# Patient Record
Sex: MALE | Race: WHITE | URBAN - METROPOLITAN AREA
[De-identification: names, ages, dates, MRNs, and addresses within clinical notes are randomized per-mention and may not be internally consistent; named-entity substitution may affect disease eponyms.]

---

## 2017-12-07 ENCOUNTER — TELEPHONE (OUTPATIENT)
Dept: SURGERY | Facility: CLINIC | Age: 62
End: 2017-12-07

## 2017-12-07 NOTE — TELEPHONE ENCOUNTER
Pre Visit Call and Assessment    Date of call:  12/07/2017    Phone numbers:  Home number on file [phone] (home)    Reached patient/confirmed appointment:  No - left message:   on voicemail  Patient care team/Primary provider:  Tae Knott  Referred to:  Dr. Yoan Olmstead    Reason for visit:  New Hernia consult

## 2017-12-08 ENCOUNTER — OFFICE VISIT (OUTPATIENT)
Dept: SURGERY | Facility: CLINIC | Age: 62
End: 2017-12-08

## 2017-12-08 ENCOUNTER — CARE COORDINATION (OUTPATIENT)
Dept: SURGERY | Facility: CLINIC | Age: 62
End: 2017-12-08

## 2017-12-08 VITALS
WEIGHT: 145.7 LBS | DIASTOLIC BLOOD PRESSURE: 49 MMHG | SYSTOLIC BLOOD PRESSURE: 96 MMHG | OXYGEN SATURATION: 97 % | BODY MASS INDEX: 20.86 KG/M2 | HEIGHT: 70 IN | HEART RATE: 102 BPM

## 2017-12-08 DIAGNOSIS — K42.9 UMBILICAL HERNIA WITHOUT OBSTRUCTION AND WITHOUT GANGRENE: Primary | ICD-10-CM

## 2017-12-08 RX ORDER — SPIRONOLACTONE 25 MG/1
50 TABLET ORAL 2 TIMES DAILY
COMMUNITY
Start: 2017-07-28

## 2017-12-08 RX ORDER — POTASSIUM CHLORIDE 1500 MG/1
20 TABLET, EXTENDED RELEASE ORAL EVERY MORNING
COMMUNITY

## 2017-12-08 RX ORDER — METOPROLOL SUCCINATE 25 MG/1
25 TABLET, EXTENDED RELEASE ORAL
COMMUNITY
Start: 2017-07-28 | End: 2018-01-16

## 2017-12-08 RX ORDER — DOXAZOSIN 1 MG/1
TABLET ORAL
COMMUNITY
Start: 2017-06-26 | End: 2018-01-16

## 2017-12-08 RX ORDER — PENTOXIFYLLINE 400 MG/1
400 TABLET, EXTENDED RELEASE ORAL
COMMUNITY
Start: 2017-07-28 | End: 2018-01-16

## 2017-12-08 RX ORDER — FOLIC ACID 1 MG/1
1 TABLET ORAL EVERY MORNING
COMMUNITY

## 2017-12-08 RX ORDER — DIPHENHYDRAMINE HCL 25 MG
50 CAPSULE ORAL
COMMUNITY

## 2017-12-08 RX ORDER — LANOLIN ALCOHOL/MO/W.PET/CERES
100 CREAM (GRAM) TOPICAL EVERY MORNING
COMMUNITY
Start: 2016-12-28

## 2017-12-08 RX ORDER — FUROSEMIDE 40 MG
60 TABLET ORAL EVERY MORNING
COMMUNITY
Start: 2017-07-28

## 2017-12-08 RX ORDER — LACTULOSE 10 G/15ML
22.5 SOLUTION ORAL EVERY MORNING
COMMUNITY

## 2017-12-08 RX ORDER — OMEPRAZOLE 40 MG/1
CAPSULE, DELAYED RELEASE ORAL
COMMUNITY
Start: 2017-03-17

## 2017-12-08 RX ORDER — LANOLIN ALCOHOL/MO/W.PET/CERES
3 CREAM (GRAM) TOPICAL AT BEDTIME
COMMUNITY
Start: 2017-07-28

## 2017-12-08 ASSESSMENT — PAIN SCALES - GENERAL: PAINLEVEL: NO PAIN (0)

## 2017-12-08 NOTE — PATIENT INSTRUCTIONS
Please wear the abdominal binder during the day when you are active.    Dr. Olmstead would like you to schedule a paracentesis the week prior to surgery. Please call the office that has ordered this for you in the past.         After Your Open Umbilical Hernia Repair          Incision care     You may take a shower the day after surgery. Carefully wash your incision with soap and water. Do not submerge yourself in water (bath, whirlpool, hot tub, pool, lake) for 14 days after surgery.     Remove the bandage the day after surgery, but leave the medical tape (Steri-Strips) or glue in place. These will loosen and fall off on their own 5 to 7 days after surgery.       Always wash your hands before touching your incisions or removing bandages.      Other medicines     Wait to start aspirin or blood thinners until the day after surgery. You can continue your regular medicines at your normal time the day after surgery.     Your pain medicine may cause constipation (hard, dry stools). To help with this, take the stool softener your doctor gave you or an over-the-counter stool softener or laxative. You can stop taking this when you are no longer taking pain medicine and your bowel movements are back to normal.      For pain or discomfort     Take the narcotic pain medicine your doctor gave you as needed and as instructed on the bottle. If you prefer to use over-the-counter medication, use acetaminophen (Tylenol) or ibuprofen (Advil, Motrin) as instructed on the box. Do not take Tylenol if it is in your narcotic pain medication.     Use an ice pack on your incision (surgical cut) for 20 minutes at a time as needed for the first 24 hours. Be sure to protect your skin by putting a cloth between the ice pack and your skin.     After 24 hours you can switch to heat for 20 minutes as needed. Be sure to protect your skin by putting a cloth between the heat pack and your skin.         Activities     No driving until you feel it s safe  to do so. Don t drive while taking narcotic pain medicine.     Don t lift anything heavier than 20 pounds for 3 to 4 weeks after surgery.      Special equipment     If you left the hospital in SARAH socks (compression stockings), you may take them off the day after surgery.     If we gave you an abdominal binder, wear it for the first 30 days after surgery. You don t have to wear it when you re sleeping. You can wear it longer than 30 days if you wish.      Diet     You can eat your regular meals after surgery.      When to call the doctor   Call your doctor if you have:     A fever above 101 F (38.3 C) (taken under the tongue), or a fever or chills lasting more than a day.     Redness at the incision site.     Any fluid or blood draining from the incision, especially if it smells bad.      Severe pain that doesn t improve with pain medicine.      We will call you 2 to 4 days after surgery to review this handout, answer questions and help arrange after-surgery care. If you have questions or concerns, please call [phone] during regular office hours. If you need to call after business hours, call [phone] and ask to page the surgeon on-call.             Transversus Abdominis Plane (TAP) Pain Block      What is a TAP block?   A TAP block can help you manage your pain after surgery. TAP stands for transversus abdominis plane, which is a muscle layer in your abdomen (belly). The TAP block uses numbing medicine similar to Novocaine to block pain near the site of your surgery.       Why get a TAP block?     To better manage your pain after surgery. A tap block will help keep the pain from getting severe and out of control.     To block pain signals from the nerve, which helps decrease pain after surgery.     To help you sleep, easily breathe deeply, walk and visit with others.      How is it done?   You will lie still on a table. We will use an ultrasound machine to help us see the correct muscle layer of your  abdomen. Then, we ll use a needle to inject the medicine. We may also give you some sleep medicine to lessen the pain of the injection.       The procedure takes between 5 and 15 minutes. It is usually done right before surgery, but will sometimes be after. It depends on your surgery and care needs.      What can I expect?     You may feel numbness, tingling or a heaviness in your abdomen.      You may have pain control up to 72 hours after surgery.     The TAP block may not lessen all of your surgery pain. But most patients feel 50 to 75 percent less pain than without the block.       Tell your nurse if you have:     Numbness or tingling in areas other than where the injection was     Blurry vision     Ringing in your ears     A metallic taste in your mouth

## 2017-12-08 NOTE — PROGRESS NOTES
Dell Matos is a 62 year old male with a 1 month history of an umbilical hernia with the following symptoms of lump.    Finding was not work related.  Onset did not occur with lifting.  Obstructive symptoms:  no  Urinary difficulties:  no  Chronic cough: no  Constipation:  no  Current level of activity:  Low, uses cane. At home with daughter.    Past medical history, medications, allergies, family history, and social history were reviewed with the patient. Liver failure. Has had inguinal hernias repaired in past. Ascites drained intermittently.    ROS: 10 point review of systems negative except noted in HPI  PHYSICAL EXAM  General appearance-  alert, and in no distress.  Skin- Skin color and turgor decreasedl.  Multiple ecchymotic lesions.  Neck- Neck is supple with no obvious adenopathy.  Lungs- Respiratory effort unlabored.  Gait- using cane.  Abdomen - soft mildly distended, non tender with wide based umbilical hernia with some skin discoloration. No infection.  Impression:  Hernia, umbilical in 62 year old patient with liver failure  Recommendation:  open surgery mesh repair after full review by medical and anesthesia team. Will need peritoneal tap week of surgery.    A full discussion regarding the alternatives, risks, goals, and potential complications for this surgery was completed today.  The patient understood that the potential problems included but are not limited to:  Infection, bleeding, hematoma, seroma, and recurrence. Knows he is at increased risk given medical condition.    The patient verbally expressed understanding, was given the opportunity for questions, and gives full informed consent for the procedure.      Today the patient was instructed on the need for a preoperative H&P, NPO status prior to surgery, and the need to stop anticoagulants prior to surgery.  Additional educational material, soap, and instructions will be mailed out to the patients home.    PAC request made. Arrangements to  be coordinated for ascites tap.    Also sent home with binder to wear at all times.    The total time spent with this patient was 45 minutes.  Of this time, greater than 50% was spent counseling and coordinating care.

## 2017-12-08 NOTE — PROGRESS NOTES
Pre and Post op Patient Education/Teaching Flowsheet  Relevant Diagnosis: hernia  Teaching Topic:  Pre and post op teaching  Person(s) Involved in teaching:  Patient and Daughter     Motivation Level:  Asks Questions:  Yes  Eager to Learn:  Yes  Cooperative:  Yes  Receptive (willing/able to accept information):  Yes  Any cultural factors/[employer] beliefs that may influence understanding or compliance?  No    Patient/caregiver/family demonstrates understanding of the following:  Reason for the appointment, diagnosis, and treatment plan:  Yes  Patient demonstrates understanding of the following:  Pre-op bowel prep:  No  Post-op pain management recommendations (medications, ice compress, binder/athletic supporter (if applicable), etc.:  Yes  Inguinal hernia patients:  Post-op urinary retention- discussed signs/symptoms and visit to ER for Kerns catheter placement and to stay in place for at least 48 hours:  NA  Restrictions:  Yes  Medications to take the day of surgery:  Per PCP  Blood thinner medications discussed and when to stop (if applicable):  Yes  Wound care:  Yes  Diabetes medication management (if applicable):  Per PCP  Which situations necessitate calling provider and whom to contact:  Discussed how to contact the hospital, nurse, and clinic scheduling staff if necessary      Date and time of surgery:  Yes  Location of surgery:  [facility] [place]- 5th [facility] or [place]?  History and Physical and any other testing necessary prior to surgery:  Yes  Required time line for completion of History and Physical and any pre-op testing:  Yes  Discuss need for someone to drive patient home and stay with them for 24 hours:  Yes  Pre-op showering/scrub information with Surgical Scrub:  Yes  NPO Guidelines:  NPO per Anesthesia Guidelines    Infection Prevention: Patient demonstrates understanding of the following:  Patient instructed on hand hygiene:  Yes  Surgical procedure site care will be  taught and will be reviewed at the time of discharge  Signs and symptoms of infection taught:  Yes  Wound care reviewed and will be taught at the time of discharge  Central venous catheter care will be taught at the time of discharge (if applicable)    Post-op follow-up:  Instructional materials used/given/mailed:  [place] Surgery Booklet, post op teaching sheet, Map, Soap, and arrival/location information    Surgical instructions mailed to patient

## 2017-12-08 NOTE — NURSING NOTE
"Chief Complaint   Patient presents with     Clinic Care Coordination - Initial     consult umbillical hernia       Vitals:    12/08/17 1132   BP: 96/49   Pulse: 102   SpO2: 97%   Weight: 66.1 kg (145 lb 11.2 oz)   Height: 1.778 m (5' 10\")       Body mass index is 20.91 kg/(m^2).    Dilma Nam, MELISA                          "

## 2017-12-08 NOTE — LETTER
12/8/2017       RE: Dell Matos  [address] [place] [id number]     Dear Colleague,    Thank you for referring your patient, Dell Matos, to the [facility] GENERAL SURGERY at [facility]. Please see a copy of my visit note below.    Dell Matos is a 62 year old male with a 1 month history of an umbilical hernia with the following symptoms of lump.    Finding was not work related.  Onset did not occur with lifting.  Obstructive symptoms:  no  Urinary difficulties:  no  Chronic cough: no  Constipation:  no  Current level of activity:  Low, uses cane. At home with daughter.    Past medical history, medications, allergies, family history, and social history were reviewed with the patient. Liver failure. Has had inguinal hernias repaired in past. Ascites drained intermittently.    ROS: 10 point review of systems negative except noted in HPI  PHYSICAL EXAM  General appearance-  alert, and in no distress.  Skin- Skin color and turgor decreasedl.  Multiple ecchymotic lesions.  Neck- Neck is supple with no obvious adenopathy.  Lungs- Respiratory effort unlabored.  Gait- using cane.  Abdomen - soft mildly distended, non tender with wide based umbilical hernia with some skin discoloration. No infection.  Impression:  Hernia, umbilical in 62 year old patient with liver failure  Recommendation:  open surgery mesh repair after full review by medical and anesthesia team. Will need peritoneal tap week of surgery.    A full discussion regarding the alternatives, risks, goals, and potential complications for this surgery was completed today.  The patient understood that the potential problems included but are not limited to:  Infection, bleeding, hematoma, seroma, and recurrence. Knows he is at increased risk given medical condition.    The patient verbally expressed understanding, was given the opportunity for questions, and gives full informed consent for the procedure.      Today the  patient was instructed on the need for a preoperative H&P, NPO status prior to surgery, and the need to stop anticoagulants prior to surgery.  Additional educational material, soap, and instructions will be mailed out to the patients home.    PAC request made. Arrangements to be coordinated for ascites tap.    Also sent home with binder to wear at all times.    The total time spent with this patient was 45 minutes.  Of this time, greater than 50% was spent counseling and coordinating care.        Again, thank you for allowing me to participate in the care of your patient.      Sincerely,    Yoan Olmstead MD

## 2017-12-12 ENCOUNTER — TELEPHONE (OUTPATIENT)
Dept: SURGERY | Facility: CLINIC | Age: 62
End: 2017-12-12

## 2017-12-12 ENCOUNTER — HOSPITAL ENCOUNTER (OUTPATIENT)
Facility: AMBULATORY SURGERY CENTER | Age: 62
End: 2017-12-12
Attending: SURGERY
Payer: COMMERCIAL

## 2017-12-12 NOTE — TELEPHONE ENCOUNTER
Per task, I called the patient and scheduled PAC for 12/20/2017 and his surgery for 12/29/2017. He is going to talk to his daughter about these dates and call me to discuss. I let him know that if he needs to he can give my number to his daughter. He agreed to this plan.

## 2017-12-12 NOTE — TELEPHONE ENCOUNTER
----- Message from Fredrick Chino RN sent at 12/8/2017 12:27 PM CST -----  Worksheet placed by SUSI.    Needs PAC.    Will need to have paracentesis the week of surgery (pt was going to call to schedule near home).    Received soap and teaching.

## 2017-12-13 ENCOUNTER — CARE COORDINATION (OUTPATIENT)
Dept: SURGERY | Facility: CLINIC | Age: 62
End: 2017-12-13

## 2017-12-13 NOTE — PROGRESS NOTES
Patient is scheduled for surgery with Dr. Olmstead 12/29. Dr. Olmstead had wanted patient to have a paracentesis earlier in the week prior to his surgery. Called patient to see if he has this scheduled at his clinic near his home. GS number provided for patient to return call.

## 2017-12-19 NOTE — PROGRESS NOTES
There was a change in patients surgery date. Patient is scheduled for surgery 1/4 with Dr. Olmstead. Patients daughter returned call and states he is scheduled for a paracentesis 1/3. Informed patients daughter that RN would make a note in patients chart regarding this.

## 2017-12-20 ENCOUNTER — TELEPHONE (OUTPATIENT)
Dept: SURGERY | Facility: CLINIC | Age: 62
End: 2017-12-20

## 2017-12-20 NOTE — TELEPHONE ENCOUNTER
The patient called to let me know that he over slept and would not be able to make it to his PAC appointment today. I tried to reschedule and he was not really able to make any decisions since his daughter, who will provide transportation for this appointment, is asleep. He asked if he would be able to have this appointment closer to home and I told him because of his health history he would need to be seen here to be evaluated for where he can have surgery and to talk to the anesthesiologist. He is going to call me once he has spoken to his daughter.

## 2017-12-21 NOTE — TELEPHONE ENCOUNTER
Patient's daughter called to reschedule PAC appointment. I confirmed time, date, location and arrival time with her.

## 2017-12-27 ENCOUNTER — CARE COORDINATION (OUTPATIENT)
Dept: SURGERY | Facility: CLINIC | Age: 62
End: 2017-12-27

## 2017-12-27 NOTE — PROGRESS NOTES
Spoke with patient and he states he couldn't get moving this morning to make his PAC appointment. Discussed with him that he would need to have this done prior to surgery, at least by Tuesday. Patient states he doesn't know if he'll be able to make it by then. Discussed with patient that he may need to postpone surgery until he feels he is able to get to his PAC appointment. Patient thought that was a good idea and would like to reschedule. He is aware that Hui, surgery scheduler, will be contacting him to discuss new PAC and surgery dates. He will also need to reschedule his paracentesis to the week before surgery and he said he is aware of this.

## 2017-12-29 ENCOUNTER — TELEPHONE (OUTPATIENT)
Dept: SURGERY | Facility: CLINIC | Age: 62
End: 2017-12-29

## 2017-12-29 NOTE — TELEPHONE ENCOUNTER
The patient called and left a message asking me to call him. I returned his call and we discussed rescheduling his surgery from 01/04/2018 to 01/12/2018. He thought it would be best since he has not been able to make the PAC appointments that he has scheduled. He knows that he needs to make that appointment before he can have surgery. I let him know that I would call his daughter next week to coordinate the PAC appointment and make sure the new surgery date would work.

## 2018-01-10 NOTE — TELEPHONE ENCOUNTER
I spoke to the patient yesterday and rescheduled his surgery. I called his daughter today to discuss details and reschedule his PAC appointment. I confirmed both clinic and surgery details with her.

## 2018-01-16 ENCOUNTER — CARE COORDINATION (OUTPATIENT)
Dept: SURGERY | Facility: CLINIC | Age: 63
End: 2018-01-16

## 2018-01-16 ENCOUNTER — APPOINTMENT (OUTPATIENT)
Dept: SURGERY | Facility: CLINIC | Age: 63
End: 2018-01-16
Payer: COMMERCIAL

## 2018-01-16 ENCOUNTER — ALLIED HEALTH/NURSE VISIT (OUTPATIENT)
Dept: SURGERY | Facility: CLINIC | Age: 63
End: 2018-01-16
Payer: COMMERCIAL

## 2018-01-16 ENCOUNTER — ANESTHESIA EVENT (OUTPATIENT)
Dept: SURGERY | Facility: CLINIC | Age: 63
End: 2018-01-16
Payer: COMMERCIAL

## 2018-01-16 ENCOUNTER — OFFICE VISIT (OUTPATIENT)
Dept: SURGERY | Facility: CLINIC | Age: 63
End: 2018-01-16
Payer: COMMERCIAL

## 2018-01-16 VITALS
TEMPERATURE: 97.9 F | BODY MASS INDEX: 20.86 KG/M2 | HEIGHT: 70 IN | DIASTOLIC BLOOD PRESSURE: 63 MMHG | OXYGEN SATURATION: 98 % | RESPIRATION RATE: 16 BRPM | SYSTOLIC BLOOD PRESSURE: 92 MMHG | WEIGHT: 145.7 LBS | HEART RATE: 108 BPM

## 2018-01-16 DIAGNOSIS — Z01.818 PREOP GENERAL PHYSICAL EXAM: ICD-10-CM

## 2018-01-16 DIAGNOSIS — Z01.818 PREOP GENERAL PHYSICAL EXAM: Primary | ICD-10-CM

## 2018-01-16 LAB
ALBUMIN SERPL-MCNC: 3.5 G/DL (ref 3.4–5)
ALP SERPL-CCNC: 180 U/L (ref 40–150)
ALT SERPL W P-5'-P-CCNC: 21 U/L (ref 0–70)
ANION GAP SERPL CALCULATED.3IONS-SCNC: 6 MMOL/L (ref 3–14)
AST SERPL W P-5'-P-CCNC: 27 U/L (ref 0–45)
BILIRUB SERPL-MCNC: 1 MG/DL (ref 0.2–1.3)
BUN SERPL-MCNC: 18 MG/DL (ref 7–30)
CALCIUM SERPL-MCNC: 9.5 MG/DL (ref 8.5–10.1)
CHLORIDE SERPL-SCNC: 93 MMOL/L (ref 94–109)
CO2 SERPL-SCNC: 29 MMOL/L (ref 20–32)
CREAT SERPL-MCNC: 1.04 MG/DL (ref 0.66–1.25)
ERYTHROCYTE [DISTWIDTH] IN BLOOD BY AUTOMATED COUNT: 13.8 % (ref 10–15)
GFR SERPL CREATININE-BSD FRML MDRD: 73 ML/MIN/1.7M2
GLUCOSE SERPL-MCNC: 108 MG/DL (ref 70–99)
HCT VFR BLD AUTO: 38.8 % (ref 40–53)
HGB BLD-MCNC: 13.4 G/DL (ref 13.3–17.7)
INR PPP: 1.13 (ref 0.86–1.14)
MCH RBC QN AUTO: 32.1 PG (ref 26.5–33)
MCHC RBC AUTO-ENTMCNC: 34.5 G/DL (ref 31.5–36.5)
MCV RBC AUTO: 93 FL (ref 78–100)
PLATELET # BLD AUTO: 197 10E9/L (ref 150–450)
POTASSIUM SERPL-SCNC: 4.6 MMOL/L (ref 3.4–5.3)
PROT SERPL-MCNC: 8.7 G/DL (ref 6.8–8.8)
RBC # BLD AUTO: 4.18 10E12/L (ref 4.4–5.9)
SODIUM SERPL-SCNC: 128 MMOL/L (ref 133–144)
WBC # BLD AUTO: 7.1 10E9/L (ref 4–11)

## 2018-01-16 RX ORDER — ACETAMINOPHEN 500 MG
1000 TABLET ORAL
COMMUNITY
Start: 2018-01-04

## 2018-01-16 ASSESSMENT — COPD QUESTIONNAIRES
COPD: 1
CAT_SEVERITY: MILD

## 2018-01-16 NOTE — PATIENT INSTRUCTIONS
Preparing for Your Surgery      Name:  Dell Matos   MRN:  [id number]   :  1955   Today's Date:  2018     Arriving for surgery:  Surgery date:  18  Arrival time:  11:45  Please come to:     [facility]  [address] [place] [id number]     Parking is available in front of the [facility] building from 5:30AM to 8:00PM.  -  Proceed to the 5th floor to check into the Ambulatory Surgery Center.              >> There will be patient concierges on the 1st and 5th floor, for assistance or an escort, if you would like.              >> Please call [phone] with any questions.    What can I eat or drink?  -  You may have solid food or milk products until 8 hours prior to your surgery.  -  You may have water, apple juice or 7up/Sprite until 2 hours prior to your surgery.    Which medicines can I take?  -  Do NOT take these medications in the morning, the day of surgery:  Aspirin and supplements x 7 days before surgery, ibuprofen x 2 days before surgery  -  Please take these medications the day of surgery:  Tylenol if needed and aldactone and lasix    How do I prepare myself?  -  Take two showers: one the night before surgery; and one the morning of surgery.         Use Scrubcare or Hibiclens to wash from neck down.  You may use your own shampoo and conditioner. No other hair products.   -  Do NOT use lotion, powder, deodorant, or antiperspirant the day of your surgery.  -  Do NOT wear any makeup, fingernail polish or jewelry.  -  Begin using Incentive Spirometer 1 week prior to surgery.  Use 4 times per day, up to 5 breaths each time.  Bring Incentive Spirometer to hospital.  -Do not bring your own medications to the hospital, except for inhalers and eye drops.  -  Bring your ID and insurance card.    Questions or Concerns:  If you have questions or concerns, please call the  Preoperative Assessment Center, Monday-Friday 7AM-7PM:   [phone]

## 2018-01-16 NOTE — H&P
Pre-Operative H & P     CC:  Preoperative exam to assess for increased cardiopulmonary risk while undergoing surgery and anesthesia.    Date of Encounter: 1/16/2018  Primary Care Physician:  Tae Knott  Dell aMtos is a 62 year old male who presents for pre-operative H & P in preparation for Open Mesh Repair Umbilical Hernia with Dr. Olmstead on 1/18/2018 at [facility].     History is obtained from the patient.   Pt has a large umbilical hernia. History of liver failure from ETOH abuse and 2 prior paracentesis. Scheduled for another paracentesis 1/17/2018.        Past Medical History  Past Medical History:   Diagnosis Date     Acute liver failure      Ascites     paracentesis 7/2017, 11/2017 and 1/17/2018     Chronic hyponatremia      COPD (chronic obstructive pulmonary disease) (H)     mild, no current treatment     Peptic ulcer      Tobacco abuse      Umbilical hernia        Past Surgical History  Past Surgical History:   Procedure Laterality Date     ARTHROPLASTY KNEE Left      ARTHROPLASTY SHOULDER Left      HERNIA REPAIR  1975       Hx of Blood transfusions/reactions: none    Hx of abnormal bleeding or anti-platelet use: none    Menstrual history: No LMP for male patient.:     Steroid use in the last year: none    Personal or FH with difficulty with Anesthesia:  None          Prior to Admission Medications  Current Outpatient Prescriptions   Medication Sig Dispense Refill     acetaminophen (TYLENOL) 500 MG tablet Take 1,000 mg by mouth nightly as needed       thiamine 100 MG tablet Take 100 mg by mouth every morning        spironolactone (ALDACTONE) 25 MG tablet Take 50 mg by mouth 2 times daily        rifaximin (XIFAXAN) 550 MG TABS tablet Take 550 mg by mouth       potassium chloride SA (K-DUR/KLOR-CON M) 20 MEQ CR tablet Take 20 mEq by mouth every morning        omeprazole (PRILOSEC) 40 MG capsule TAKE 1 CAPSULE BY MOUTH TWI CE A DAY       melatonin 3 MG  tablet Take 3 mg by mouth At Bedtime        cholecalciferol (D 5000) 5000 UNITS CAPS Take 5,000 Units by mouth every morning        diphenhydrAMINE (BENADRYL) 25 MG capsule Take 50 mg by mouth       folic acid (FOLVITE) 1 MG tablet Take 1 mg by mouth every morning        furosemide (LASIX) 40 MG tablet Take 60 mg by mouth every morning        lactulose (CHRONULAC) 10 GM/15ML solution Take 22.5 mLs by mouth every morning        order for DME Abdominal Binder - Medium 1 each 0       Allergies  Allergies   Allergen Reactions     Lisinopril Anaphylaxis       Social History  Social History     Social History     Marital status: Single     Spouse name: N/A     Number of children: N/A     Years of education: N/A     Occupational History     Not on file.     Social History Main Topics     Smoking status: Current Every Day Smoker     Packs/day: 1.00     Years: 30.00     Types: Cigarettes     Smokeless tobacco: Never Used     Alcohol use No      Comment: quit 7/2017     Drug use: No     Sexual activity: Not on file     Other Topics Concern     Not on file     Social History Narrative           Family History  No family history on file.      Anesthesia Evaluation     . Pt has had prior anesthetic.            ROS/MED HX    ENT/Pulmonary:     (+)mild COPD, , . .    Neurologic:  - neg neurologic ROS     Cardiovascular:     (+) hypertension----. : . . . :. . Previous cardiac testing date:results:date: results:ECG reviewed date: results: date: results:          METS/Exercise Tolerance:  4 - Raking leaves, gardening   Hematologic:  - neg hematologic  ROS       Musculoskeletal:         GI/Hepatic:     (+) hepatitis type Alcoholic, liver disease,       Renal/Genitourinary:  - ROS Renal section negative       Endo:  - neg endo ROS       Psychiatric:  - neg psychiatric ROS       Infectious Disease:  - neg infectious disease ROS       Malignancy:      - no malignancy   Other:    (+) No chance of pregnancy C-spine cleared: N/A, no H/O  "Chronic Pain,no other significant disability   - neg other ROS             Physical Exam  Normal systems: cardiovascular, pulmonary and dental    Airway   Mallampati: II  TM distance: >3 FB  Neck ROM: full    Dental   Normal    Cardiovascular   Rhythm and rate: regular and normal      Pulmonary    breath sounds clear to auscultation          The complete review of systems is negative other than noted in the HPI or here.   Temp: 97.9  F (36.6  C) Temp src: Oral BP: 92/63 Pulse: 108   Resp: 16 SpO2: 98 %         145 lbs 11.2 oz  5' 10\"   Body mass index is 20.91 kg/(m^2).       Physical Exam  Constitutional: Awake, alert, cooperative, no apparent distress, and appears stated age.  Eyes: Pupils equal, round and reactive to light, extra ocular muscles intact, sclera clear, conjunctiva normal.  HENT: Normocephalic, oral pharynx with moist mucus membranes, good dentition. No goiter appreciated.   Respiratory: Clear to auscultation bilaterally, no crackles or wheezing.  Cardiovascular: Regular rate and rhythm, normal S1 and S2, and no murmur noted.  Carotids +2, no bruits. No edema. Palpable pulses to radial  DP and PT arteries.   GI: Normal bowel sounds, soft, large umbilical hernia.  Lymph/Hematologic: No cervical lymphadenopathy and no supraclavicular lymphadenopathy.  Genitourinary:  deferred  Skin: Warm and dry.  No rashes at anticipated surgical site.   Musculoskeletal: Full ROM of neck. There is no redness, warmth, or swelling of the joints. General weakness  Neurologic: Awake, alert, oriented to name, place and time. Cranial nerves II-XII are grossly intact. Gait is slow, ambulates with cane  Neuropsychiatric: Calm, cooperative. Normal affect.     Labs: (personally reviewed)  Results for FELY ELLER (MRN [id number]) as of 1/16/2018 15:13   Ref. Range 1/16/2018 13:53   Sodium Latest Ref Range: 133 - 144 mmol/L 128 (L)   Potassium Latest Ref Range: 3.4 - 5.3 mmol/L 4.6   Chloride Latest Ref Range: 94 - 109 mmol/L " 93 (L)   Carbon Dioxide Latest Ref Range: 20 - 32 mmol/L 29   Urea Nitrogen Latest Ref Range: 7 - 30 mg/dL 18   Creatinine Latest Ref Range: 0.66 - 1.25 mg/dL 1.04   GFR Estimate Latest Ref Range: >60 mL/min/1.7m2 73   GFR Estimate If Black Latest Ref Range: >60 mL/min/1.7m2 88   Calcium Latest Ref Range: 8.5 - 10.1 mg/dL 9.5   Anion Gap Latest Ref Range: 3 - 14 mmol/L 6   Albumin Latest Ref Range: 3.4 - 5.0 g/dL 3.5   Protein Total Latest Ref Range: 6.8 - 8.8 g/dL 8.7   Bilirubin Total Latest Ref Range: 0.2 - 1.3 mg/dL 1.0   Alkaline Phosphatase Latest Ref Range: 40 - 150 U/L 180 (H)   ALT Latest Ref Range: 0 - 70 U/L 21   AST Latest Ref Range: 0 - 45 U/L 27   Glucose Latest Ref Range: 70 - 99 mg/dL 108 (H)   WBC Latest Ref Range: 4.0 - 11.0 10e9/L 7.1   Hemoglobin Latest Ref Range: 13.3 - 17.7 g/dL 13.4   Hematocrit Latest Ref Range: 40.0 - 53.0 % 38.8 (L)   Platelet Count Latest Ref Range: 150 - 450 10e9/L 197   RBC Count Latest Ref Range: 4.4 - 5.9 10e12/L 4.18 (L)   MCV Latest Ref Range: 78 - 100 fl 93   MCH Latest Ref Range: 26.5 - 33.0 pg 32.1   MCHC Latest Ref Range: 31.5 - 36.5 g/dL 34.5   RDW Latest Ref Range: 10.0 - 15.0 % 13.8   INR Latest Ref Range: 0.86 - 1.14  1.13         EKG: Personally reviewed but formal cardiology read pendin2018 SR, RBBB        Cardiac echo: 2016  RESULTS  LEFT ATRIUM:    Normal in size.     MITRAL VALVE:    Trace insufficiency.     LEFT VENTRICLE:    Normal LV chamber volume. Normal systolic function. Ejection fraction is 65%.  Stage I diastolic dysfunction.     AORTIC VALVE:    The aortic valve appears to be tricuspid morphology. No evidence of stenosis or significant insufficiency.     AORTA:    The aortic root was upper limits of normal in size.    RIGHT ATRIUM:    Normal in size.     TRICUSPID VALVE:    Normal.    RIGHT VENTRICLE:    Normal in size and function.     PULMONIC VALVE:    Not well visualized.     PERICARDIUM:    No pericardial effusion.      CONCLUSION:  1. Normal LV size and systolic function.  Ejection fraction is 65%.     2. Trace MR.     3. Stage I diastolic dysfunction.     Note: This echocardiogram was performed by [employer] for [facility].  Only the interpretation was performed by the [facility].             ASSESSMENT and PLAN  Dell Matos is a 62 year old male scheduled to undergo Open Mesh Repair Umbilical Hernia. He has the following specific operative considerations:   - RCRI : Low serious cardiac risks.  0.9% risk of major adverse cardiac event.   - Anesthesia considerations:  Refer to PAC assessment in anesthesia records  - VTE risk: 1.8%  - ALANNA # of risks 2/8 = low risk  - Post-op delirium risk: low risk  - Risk of PONV score = 2.  If > 2, anti-emetic intervention recommended.      Previous anesthesia without complications.  1) Cardiac: HTN, well managed. EKG today NSR with RBBB  2) Pulmonary: COPD, mild and currently not being treated. Denies pulmonary symptoms.  3) GI: liver failure from ETOH abuse. Sobriety since diagnosis 7/2017. Has had 2 prior paracentesis, 7/2017 and 11/2017. Scheduled for 1 more 1/17/2018 prior to surgery. History of peptic ulcer, prophylactic on Prilosec.  4) Other: chronic hyponatremia, usually 128-130.  Walks about 1 to 2 blocks with cane  Feasible airway   Pt optimized for surgery. AVS with information on surgery time/arrival time, meds and NPO status given by nursing staff    Please move pt from [facility] to [place]      Patient was discussed with Dr Espinal.    ANNA Macias CNS  Preoperative Assessment Center  [facility]  Clinic and Surgery Center  Phone: [phone]  Fax: [phone]

## 2018-01-16 NOTE — ANESTHESIA PREPROCEDURE EVALUATION
Anesthesia Evaluation     . Pt has had prior anesthetic. Type: General and MAC           ROS/MED HX    ENT/Pulmonary:     (+)tobacco use, Past use mild COPD, , . .    Neurologic:  - neg neurologic ROS     Cardiovascular:     (+) hypertension----. : . . . :. . Previous cardiac testing date:results:date: results:ECG reviewed date:1/16/2018 results:SR, RBBB date: results:          METS/Exercise Tolerance:  4 - Raking leaves, gardening   Hematologic:  - neg hematologic  ROS       Musculoskeletal:         GI/Hepatic: Comment: Ascites tapped on yesterday; 4 liters removed.    (+) hepatitis type Alcoholic, liver disease,       Renal/Genitourinary:  - ROS Renal section negative       Endo:  - neg endo ROS       Psychiatric:  - neg psychiatric ROS       Infectious Disease:  - neg infectious disease ROS       Malignancy:      - no malignancy   Other:    (+) No chance of pregnancy C-spine cleared: N/A, no H/O Chronic Pain,no other significant disability   - neg other ROS                 Physical Exam  Normal systems: cardiovascular, pulmonary and dental    Airway   Mallampati: II  TM distance: >3 FB  Neck ROM: full    Dental     Cardiovascular   Rhythm and rate: regular and normal      Pulmonary    breath sounds clear to auscultation    Other findings:   Results for FELY ELLER (MRN [id number]) as of 1/16/2018 15:13    1/16/2018 13:53  Sodium: 128 (L)  Potassium: 4.6  Chloride: 93 (L)  Carbon Dioxide: 29  Urea Nitrogen: 18  Creatinine: 1.04  GFR Estimate: 73  GFR Estimate If Black: 88  Calcium: 9.5  Anion Gap: 6  Albumin: 3.5  Protein Total: 8.7  Bilirubin Total: 1.0  Alkaline Phosphatase: 180 (H)  ALT: 21  AST: 27  Glucose: 108 (H)  WBC: 7.1  Hemoglobin: 13.4  Hematocrit: 38.8 (L)  Platelet Count: 197  RBC Count: 4.18 (L)  MCV: 93  MCH: 32.1  MCHC: 34.5  RDW: 13.8  INR: 1.13           PAC Discussion and Assessment    ASA Classification: 3  Case is suitable for: [facility]  Anesthetic techniques and relevant risks  discussed: MAC with GA as backup  Invasive monitoring and risk discussed:   Types:   Possibility and Risk of blood transfusion discussed:   NPO instructions given:   Additional anesthetic preparation and risks discussed:   Needs early admission to pre-op area:   Other:     PAC Resident/NP Anesthesia Assessment:  Dell Matos is a 63 yo male scheduled for Open Mesh Repair Umbilical Hernia on 1/18/2018 by Dr. Olmstead. PAC referral by Dr. Olmstead for assessment and optimization of anesthesia. Previous anesthesia without complications.    1) Cardiac: HTN, well managed. EKG today NSR with RBBB  2) Pulmonary: COPD, mild and currently not being treated. Denies pulmonary symptoms.  3) GI: liver failure from ETOH abuse. Sobriety since diagnosis 7/2017. Has had 2 prior paracentesis, 7/2017 and 11/2017. Scheduled for 1 more 1/17/2018 prior to surgery. History of peptic ulcer, prophylactic on Prilosec. Large umbilical hernia.  4) Other: chronic hyponatremia, usually 128-130.    Walks about 1 to 2 blocks with cane  Feasible airway      Pt also evaluated by Dr. Espinal. Please see recommendations below. Labs drawn today.  I spent 20 minutes face to face with pt, assessing, examining, and educating          Reviewed and Signed by PAC Mid-Level Provider/Resident  Mid-Level Provider/Resident: ANNA Walker  Date: 1/16/2018  Time: 1608    Attending Anesthesiologist Anesthesia Assessment:  62 year old for hernia repair. Patient/case discussed with ISHMAEL. No need to see patient. Patient has COPD that is mild, no inhalers; stable cirrhosis, but does have paracentesis.  Patient is appropriate for the planned procedure without further work-up or medical management.      Reviewed and Signed by PAC Anesthesiologist  Anesthesiologist: elidia  Date: 1/16/2018  Time:   Pass/Fail: Pass  Disposition:     PAC Pharmacist Assessment:        Pharmacist:   Date:   Time:      Anesthesia Plan      History & Physical Review  History and physical reviewed  and following examination; no interval change.    ASA Status:  3 .        Plan for MAC with Intravenous induction. Maintenance will be TIVA.    PONV prophylaxis:  Ondansetron (or other 5HT-3) and Dexamethasone or Solumedrol       Postoperative Care  Postoperative pain management:  Multi-modal analgesia.      Consents  Anesthetic plan, risks, benefits and alternatives discussed with:  Patient..                          .

## 2018-01-16 NOTE — PROGRESS NOTES
Patient met with PAC today for surgical clearance.  Due to patients health history his surgery was moved to [place] at 1:30 PM with 11:30 AM arrival.  Attempted to reach patient - no answer.  LM on VM with change.  Plan to follow up tomorrow with another telephone call.

## 2018-01-16 NOTE — MR AVS SNAPSHOT
After Visit Summary   2018    Dell Matos    MRN: [id number]           Patient Information     Date Of Birth          1955        Visit Information        Provider Department      2018 2:00 PM Rn, [facility] Preoperative Assessment Center        Care Instructions    Preparing for Your Surgery      Name:  Dell Matos   MRN:  [id number]   :  1955   Today's Date:  2018     Arriving for surgery:  Surgery date:  18  Arrival time:  11:45  Please come to:     [facility]  [address] [place] [id number]     Parking is available in front of the [facility] building from 5:30AM to 8:00PM.  -  Proceed to the 5th floor to check into the Ambulatory Surgery Center.              >> There will be patient concierges on the 1st and 5th floor, for assistance or an escort, if you would like.              >> Please call [phone] with any questions.    What can I eat or drink?  -  You may have solid food or milk products until 8 hours prior to your surgery.  -  You may have water, apple juice or 7up/Sprite until 2 hours prior to your surgery.    Which medicines can I take?  -  Do NOT take these medications in the morning, the day of surgery:  Aspirin and supplements x 7 days before surgery, ibuprofen x 2 days before surgery  -  Please take these medications the day of surgery:  Tylenol if needed and aldactone and lasix    How do I prepare myself?  -  Take two showers: one the night before surgery; and one the morning of surgery.         Use Scrubcare or Hibiclens to wash from neck down.  You may use your own shampoo and conditioner. No other hair products.   -  Do NOT use lotion, powder, deodorant, or antiperspirant the day of your surgery.  -  Do NOT wear any makeup, fingernail polish or jewelry.  -  Begin using Incentive Spirometer 1 week prior to surgery.  Use 4 times per day, up to 5 breaths each time.  Bring Incentive  Spirometer to hospital.  -Do not bring your own medications to the hospital, except for inhalers and eye drops.  -  Bring your ID and insurance card.    Questions or Concerns:  If you have questions or concerns, please call the  Preoperative Assessment Center, Monday-Friday 7AM-7PM:  [phone]                    Follow-ups after your visit        Your next 10 appointments already scheduled     Jan 16, 2018  2:00 PM CST   (Arrive by 1:45 PM)   PAC RN ASSESSMENT with  Pac Rn   [facility] Preoperative Assessment Center ([facility])    [address] [place] [address]  4th [facility] [id number] [phone]            Jan 16, 2018  2:30 PM CST   (Arrive by 2:15 PM)   PAC Anesthesia Consult with  Pac Anesthesiologist   [facility] Preoperative Assessment [place] ([facility])    [address] [place] [id number] [phone]            Jan 16, 2018  2:45 PM CST   LAB with  LAB   [facility] ([facility])    [address] [place] [id number] [phone]           Please do not eat 10-12 hours before your appointment if you are coming in fasting for labs on lipids, cholesterol, or glucose (sugar). This does not apply to pregnant women. Water, hot tea and black coffee (with nothing added) are okay. Do not drink other fluids, diet soda or chew gum.            Jan 18, 2018   Procedure with Yoan Olmstead MD   [facility] ([facility])    [address] [place] [address]  5th [facility] [id number] [phone]           Located in the Clinics and Surgery Center at [address] [place] [address].   parking is very convenient and highly recommended.  is a $6 flat rate fee.  Both  and self parkers should enter the main arrival plaza from [facility]; parking attendants will direct you based on your parking  preference.              Future tests that were ordered for you today     Open Future Orders        Priority Expected Expires Ordered    CBC with platelets Routine 2018 2/15/2018 2018    Comprehensive metabolic panel Routine 2018 2/15/2018 2018    INR Routine 2018 2/15/2018 2018            Who to contact     Please call your clinic at [phone] to:    Ask questions about your health    Make or cancel appointments    Discuss your medicines    Learn about your test results    Speak to your doctor   If you have compliments or concerns about an experience at your clinic, or if you wish to file a complaint, please contact [facility] Patient Relations at [phone] or email us at [email or web address]         Additional Information About Your Visit        "[employer] Information     "[employer] is an electronic gateway that provides easy, online access to your medical records. With "[employer] you can request a clinic appointment, read your test results, renew a prescription or communicate with your care team.     To sign up for "[employer] visit the website at [email or web address]   You will be asked to enter the access code listed below, as well as some personal information. Please follow the directions to create your username and password.     Your access code is: [email or web address]  Expires: 3/5/2018  6:30 AM     Your access code will  in 90 days. If you need help or a new code, please contact your [facility] Clinic or call [phone] for assistance.        Care EveryWhere ID     This is your Care EveryWhere ID. This could be used by other organizations to access your [facility] medical records  [id number]         Blood Pressure from Last 3 Encounters:   18 92/63   17 96/49    Weight from Last 3 Encounters:   18 66.1 kg (145 lb 11.2 oz)   17 66.1 kg (145 lb 11.2 oz)              Today, you had the  following     No orders found for display       Primary Care Provider Office Phone # Fax #    Tae Knott, GIORGIO [phone]       [facility] SERVICES [address]  [facility] [address]        Equal Access to Services     LILIANA DE LA CRUZ : Tyrese irwin yuriyo Solawsonali, waaxda luqadaha, qaybta kaalmada adeegyada, waxgume espinozan lady lambert joyce suarez. So [facility] [phone].    ATENCIÓN: Si habla español, tiene a doss disposición servicios gratuitos de asistencia lingüística. [facility] [phone].    We comply with applicable federal civil rights laws and [place] laws. We do not discriminate on the basis of race, color, national origin, age, disability, sex, sexual orientation, or gender identity.            Thank you!     Thank you for choosing [facility] PREOPERATIVE ASSESSMENT CENTER  for your care. Our goal is always to provide you with excellent care. Hearing back from our patients is one way we can continue to improve our services. Please take a few minutes to complete the written survey that you may receive in the mail after your visit with us. Thank you!             Your Updated Medication List - Protect others around you: Learn how to safely use, store and throw away your medicines at www.disposemymeds.org.          This list is accurate as of: 1/16/18  1:31 PM.  Always use your most recent med list.                   Brand Name Dispense Instructions for use Diagnosis    acetaminophen 500 MG tablet    TYLENOL     Take 1,000 mg by mouth nightly as needed        D 5000 5000 UNITS Caps   Generic drug:  cholecalciferol      Take 5,000 Units by mouth every morning        diphenhydrAMINE 25 MG capsule    BENADRYL     Take 50 mg by mouth        folic acid 1 MG tablet    FOLVITE     Take 1 mg by mouth every morning        furosemide 40 MG tablet    LASIX     Take 60 mg by mouth every morning        lactulose 10 GM/15ML solution    CHRONULAC     Take 22.5 mLs by mouth every morning        melatonin  3 MG tablet      Take 3 mg by mouth At Bedtime        omeprazole 40 MG capsule    priLOSEC     TAKE 1 CAPSULE BY MOUTH TWI CE A DAY        order for DME     1 each    Abdominal Binder - Medium    Umbilical hernia without obstruction and without gangrene       potassium chloride SA 20 MEQ CR tablet    K-DUR/KLOR-CON M     Take 20 mEq by mouth every morning        rifaximin 550 MG Tabs tablet    XIFAXAN     Take 550 mg by mouth        spironolactone 25 MG tablet    ALDACTONE     Take 50 mg by mouth 2 times daily        thiamine 100 MG tablet      Take 100 mg by mouth every morning

## 2018-01-17 ENCOUNTER — TELEPHONE (OUTPATIENT)
Dept: SURGERY | Facility: CLINIC | Age: 63
End: 2018-01-17

## 2018-01-17 LAB — INTERPRETATION ECG - MUSE: NORMAL

## 2018-01-17 NOTE — TELEPHONE ENCOUNTER
----- Message from Magaly Frey RN sent at 1/16/2018  3:33 PM CST -----  Regarding: Moved case  Hui,    Patient met with PAC today and they would prefer this patients surgery at [place].  I moved his case to [place] (same day, 1/18) but will start at 1:30 Pm with 11:30 am arrival.    I left him a message on his VM as I think he is still at the [facility].  Can you please make a follow up call and provide him with the correct address?  I did update [facility].    Thanks,  Magaly

## 2018-01-17 NOTE — TELEPHONE ENCOUNTER
I called the patient's daughter and left a message with the address for [facility] as well as arrival time and case start time. I asked her to call if she had questions.

## 2018-01-18 ENCOUNTER — ANESTHESIA (OUTPATIENT)
Dept: SURGERY | Facility: CLINIC | Age: 63
End: 2018-01-18
Payer: COMMERCIAL

## 2018-01-18 ENCOUNTER — HOSPITAL ENCOUNTER (OUTPATIENT)
Facility: CLINIC | Age: 63
Discharge: HOME | End: 2018-01-18
Attending: SURGERY | Admitting: SURGERY
Payer: COMMERCIAL

## 2018-01-18 VITALS
HEIGHT: 69 IN | WEIGHT: 136.24 LBS | SYSTOLIC BLOOD PRESSURE: 115 MMHG | RESPIRATION RATE: 16 BRPM | DIASTOLIC BLOOD PRESSURE: 74 MMHG | TEMPERATURE: 97.7 F | OXYGEN SATURATION: 97 % | BODY MASS INDEX: 20.18 KG/M2

## 2018-01-18 DIAGNOSIS — K42.9 UMBILICAL HERNIA WITHOUT OBSTRUCTION AND WITHOUT GANGRENE: Primary | ICD-10-CM

## 2018-01-18 LAB
ABO + RH BLD: NORMAL
ABO + RH BLD: NORMAL
BLD GP AB SCN SERPL QL: NORMAL
BLOOD BANK CMNT PATIENT-IMP: NORMAL
BLOOD BANK CMNT PATIENT-IMP: NORMAL
CREAT SERPL-MCNC: 1.14 MG/DL (ref 0.66–1.25)
GFR SERPL CREATININE-BSD FRML MDRD: 65 ML/MIN/1.7M2
GLUCOSE BLDC GLUCOMTR-MCNC: 103 MG/DL (ref 70–99)
GLUCOSE SERPL-MCNC: 97 MG/DL (ref 70–99)
HGB BLD-MCNC: 12.8 G/DL (ref 13.3–17.7)
INR PPP: 1.14 (ref 0.86–1.14)
POTASSIUM SERPL-SCNC: 4.7 MMOL/L (ref 3.4–5.3)
SODIUM SERPL-SCNC: 132 MMOL/L (ref 133–144)
SPECIMEN EXP DATE BLD: NORMAL

## 2018-01-18 PROCEDURE — 37000008 ZZH ANESTHESIA TECHNICAL FEE, 1ST 30 MIN: Performed by: SURGERY

## 2018-01-18 PROCEDURE — 25000132 ZZH RX MED GY IP 250 OP 250 PS 637: Performed by: ANESTHESIOLOGY

## 2018-01-18 PROCEDURE — 82947 ASSAY GLUCOSE BLOOD QUANT: CPT | Performed by: ANESTHESIOLOGY

## 2018-01-18 PROCEDURE — 25000125 ZZHC RX 250: Performed by: SURGERY

## 2018-01-18 PROCEDURE — 37000009 ZZH ANESTHESIA TECHNICAL FEE, EACH ADDTL 15 MIN: Performed by: SURGERY

## 2018-01-18 PROCEDURE — 82565 ASSAY OF CREATININE: CPT | Performed by: ANESTHESIOLOGY

## 2018-01-18 PROCEDURE — 85018 HEMOGLOBIN: CPT | Performed by: ANESTHESIOLOGY

## 2018-01-18 PROCEDURE — 40000171 ZZH STATISTIC PRE-PROCEDURE ASSESSMENT III: Performed by: SURGERY

## 2018-01-18 PROCEDURE — 25000128 H RX IP 250 OP 636: Performed by: NURSE ANESTHETIST, CERTIFIED REGISTERED

## 2018-01-18 PROCEDURE — 36415 COLL VENOUS BLD VENIPUNCTURE: CPT | Performed by: ANESTHESIOLOGY

## 2018-01-18 PROCEDURE — 82962 GLUCOSE BLOOD TEST: CPT

## 2018-01-18 PROCEDURE — 36000053 ZZH SURGERY LEVEL 2 EA 15 ADDTL MIN - UMMC: Performed by: SURGERY

## 2018-01-18 PROCEDURE — 25000125 ZZHC RX 250: Performed by: NURSE ANESTHETIST, CERTIFIED REGISTERED

## 2018-01-18 PROCEDURE — 85610 PROTHROMBIN TIME: CPT | Performed by: ANESTHESIOLOGY

## 2018-01-18 PROCEDURE — 84132 ASSAY OF SERUM POTASSIUM: CPT | Performed by: ANESTHESIOLOGY

## 2018-01-18 PROCEDURE — C1781 MESH (IMPLANTABLE): HCPCS | Performed by: SURGERY

## 2018-01-18 PROCEDURE — 71000027 ZZH RECOVERY PHASE 2 EACH 15 MINS: Performed by: SURGERY

## 2018-01-18 PROCEDURE — 25000128 H RX IP 250 OP 636: Performed by: SURGERY

## 2018-01-18 PROCEDURE — 36000051 ZZH SURGERY LEVEL 2 1ST 30 MIN - UMMC: Performed by: SURGERY

## 2018-01-18 PROCEDURE — 27210794 ZZH OR GENERAL SUPPLY STERILE: Performed by: SURGERY

## 2018-01-18 PROCEDURE — 84295 ASSAY OF SERUM SODIUM: CPT | Performed by: ANESTHESIOLOGY

## 2018-01-18 DEVICE — MESH POLYESTER PROGRIP 6X6" (15X15CM) PARIETEX TEM1515G: Type: IMPLANTABLE DEVICE | Site: UMBILICAL | Status: FUNCTIONAL

## 2018-01-18 RX ORDER — CEFAZOLIN SODIUM 1 G/3ML
1 INJECTION, POWDER, FOR SOLUTION INTRAMUSCULAR; INTRAVENOUS SEE ADMIN INSTRUCTIONS
Status: DISCONTINUED | OUTPATIENT
Start: 2018-01-18 | End: 2018-01-18 | Stop reason: HOSPADM

## 2018-01-18 RX ORDER — CITRIC ACID/SODIUM CITRATE 334-500MG
30 SOLUTION, ORAL ORAL ONCE
Status: COMPLETED | OUTPATIENT
Start: 2018-01-18 | End: 2018-01-18

## 2018-01-18 RX ORDER — ONDANSETRON 4 MG/1
4 TABLET, ORALLY DISINTEGRATING ORAL EVERY 30 MIN PRN
Status: DISCONTINUED | OUTPATIENT
Start: 2018-01-18 | End: 2018-01-18 | Stop reason: HOSPADM

## 2018-01-18 RX ORDER — MEPERIDINE HYDROCHLORIDE 25 MG/ML
12.5 INJECTION INTRAMUSCULAR; INTRAVENOUS; SUBCUTANEOUS
Status: DISCONTINUED | OUTPATIENT
Start: 2018-01-18 | End: 2018-01-18 | Stop reason: HOSPADM

## 2018-01-18 RX ORDER — ONDANSETRON 2 MG/ML
INJECTION INTRAMUSCULAR; INTRAVENOUS PRN
Status: DISCONTINUED | OUTPATIENT
Start: 2018-01-18 | End: 2018-01-18

## 2018-01-18 RX ORDER — SODIUM CHLORIDE, SODIUM LACTATE, POTASSIUM CHLORIDE, CALCIUM CHLORIDE 600; 310; 30; 20 MG/100ML; MG/100ML; MG/100ML; MG/100ML
INJECTION, SOLUTION INTRAVENOUS CONTINUOUS
Status: DISCONTINUED | OUTPATIENT
Start: 2018-01-18 | End: 2018-01-18 | Stop reason: HOSPADM

## 2018-01-18 RX ORDER — PROPOFOL 10 MG/ML
INJECTION, EMULSION INTRAVENOUS CONTINUOUS PRN
Status: DISCONTINUED | OUTPATIENT
Start: 2018-01-18 | End: 2018-01-18

## 2018-01-18 RX ORDER — FENTANYL CITRATE 50 UG/ML
INJECTION, SOLUTION INTRAMUSCULAR; INTRAVENOUS PRN
Status: DISCONTINUED | OUTPATIENT
Start: 2018-01-18 | End: 2018-01-18

## 2018-01-18 RX ORDER — BUPIVACAINE HYDROCHLORIDE 5 MG/ML
INJECTION, SOLUTION PERINEURAL PRN
Status: DISCONTINUED | OUTPATIENT
Start: 2018-01-18 | End: 2018-01-18 | Stop reason: HOSPADM

## 2018-01-18 RX ORDER — PROPOFOL 10 MG/ML
INJECTION, EMULSION INTRAVENOUS PRN
Status: DISCONTINUED | OUTPATIENT
Start: 2018-01-18 | End: 2018-01-18

## 2018-01-18 RX ORDER — LIDOCAINE HYDROCHLORIDE 20 MG/ML
INJECTION, SOLUTION INFILTRATION; PERINEURAL PRN
Status: DISCONTINUED | OUTPATIENT
Start: 2018-01-18 | End: 2018-01-18

## 2018-01-18 RX ORDER — NALOXONE HYDROCHLORIDE 0.4 MG/ML
.1-.4 INJECTION, SOLUTION INTRAMUSCULAR; INTRAVENOUS; SUBCUTANEOUS
Status: DISCONTINUED | OUTPATIENT
Start: 2018-01-18 | End: 2018-01-18 | Stop reason: HOSPADM

## 2018-01-18 RX ORDER — CEFAZOLIN SODIUM 2 G/100ML
2 INJECTION, SOLUTION INTRAVENOUS
Status: COMPLETED | OUTPATIENT
Start: 2018-01-18 | End: 2018-01-18

## 2018-01-18 RX ORDER — SODIUM CHLORIDE, SODIUM LACTATE, POTASSIUM CHLORIDE, CALCIUM CHLORIDE 600; 310; 30; 20 MG/100ML; MG/100ML; MG/100ML; MG/100ML
INJECTION, SOLUTION INTRAVENOUS CONTINUOUS PRN
Status: DISCONTINUED | OUTPATIENT
Start: 2018-01-18 | End: 2018-01-18

## 2018-01-18 RX ORDER — AMOXICILLIN 250 MG
1-2 CAPSULE ORAL 2 TIMES DAILY
Qty: 30 TABLET | Refills: 0 | Status: SHIPPED | OUTPATIENT
Start: 2018-01-18

## 2018-01-18 RX ORDER — FENTANYL CITRATE 50 UG/ML
25-50 INJECTION, SOLUTION INTRAMUSCULAR; INTRAVENOUS
Status: DISCONTINUED | OUTPATIENT
Start: 2018-01-18 | End: 2018-01-18 | Stop reason: HOSPADM

## 2018-01-18 RX ORDER — OXYCODONE HYDROCHLORIDE 5 MG/1
5-10 TABLET ORAL
Qty: 20 TABLET | Refills: 0 | Status: SHIPPED | OUTPATIENT
Start: 2018-01-18

## 2018-01-18 RX ORDER — ONDANSETRON 2 MG/ML
4 INJECTION INTRAMUSCULAR; INTRAVENOUS EVERY 30 MIN PRN
Status: DISCONTINUED | OUTPATIENT
Start: 2018-01-18 | End: 2018-01-18 | Stop reason: HOSPADM

## 2018-01-18 RX ORDER — OXYCODONE HYDROCHLORIDE 5 MG/1
5 TABLET ORAL
Status: DISCONTINUED | OUTPATIENT
Start: 2018-01-18 | End: 2018-01-18 | Stop reason: HOSPADM

## 2018-01-18 RX ADMIN — PROPOFOL 20 MG: 10 INJECTION, EMULSION INTRAVENOUS at 13:40

## 2018-01-18 RX ADMIN — ONDANSETRON 4 MG: 2 INJECTION INTRAMUSCULAR; INTRAVENOUS at 14:20

## 2018-01-18 RX ADMIN — FENTANYL CITRATE 50 MCG: 50 INJECTION, SOLUTION INTRAMUSCULAR; INTRAVENOUS at 13:39

## 2018-01-18 RX ADMIN — LIDOCAINE HYDROCHLORIDE 40 MG: 20 INJECTION, SOLUTION INFILTRATION; PERINEURAL at 13:36

## 2018-01-18 RX ADMIN — SODIUM CITRATE AND CITRIC ACID MONOHYDRATE 30 ML: 500; 334 SOLUTION ORAL at 13:22

## 2018-01-18 RX ADMIN — CEFAZOLIN SODIUM 2 G: 2 INJECTION, SOLUTION INTRAVENOUS at 13:41

## 2018-01-18 RX ADMIN — PROPOFOL 20 MG: 10 INJECTION, EMULSION INTRAVENOUS at 13:55

## 2018-01-18 RX ADMIN — PROPOFOL 75 MCG/KG/MIN: 10 INJECTION, EMULSION INTRAVENOUS at 13:39

## 2018-01-18 RX ADMIN — SODIUM CHLORIDE, POTASSIUM CHLORIDE, SODIUM LACTATE AND CALCIUM CHLORIDE: 600; 310; 30; 20 INJECTION, SOLUTION INTRAVENOUS at 13:24

## 2018-01-18 ASSESSMENT — LIFESTYLE VARIABLES: TOBACCO_USE: 1

## 2018-01-18 NOTE — DISCHARGE INSTRUCTIONS
Same-Day Surgery   Adult Discharge Orders & Instructions     For 24 hours after surgery:  1. Get plenty of rest.  A responsible adult must stay with you for at least 24 hours after you leave the hospital.   2. Pain medication can slow your reflexes. Do not drive or use heavy equipment.  If you have weakness or tingling, don't drive or use heavy equipment until this feeling goes away.  3. Mixing alcohol and pain medication can cause dizziness and slow your breathing. It can even be fatal. Do not drink alcohol while taking pain medication.  4. Avoid strenuous or risky activities.  Ask for help when climbing stairs.   5. You may feel lightheaded.  If so, sit for a few minutes before standing.  Have someone help you get up.   6. If you have nausea (feel sick to your stomach), drink only clear liquids such as apple juice, ginger ale, broth or 7-Up.  Rest may also help.  Be sure to drink enough fluids.  Move to a regular diet as you feel able. Take pain medications with a small amount of solid food, such as toast or crackers, to avoid nausea.   7. A slight fever is normal. Call the doctor if your fever is over 100 F (37.7 C) (taken under the tongue) or lasts longer than 24 hours.  8. You may have a dry mouth, muscle aches, trouble sleeping or a sore throat.  These symptoms should go away after 24 hours.  9. Do not make important or legal decisions.   Pain Management:      1. Take pain medication (if prescribed) for pain as directed by your physician.        2. WARNING: If the pain medication you have been prescribed contains Tylenol  (acetaminophen), DO NOT take additional doses of Tylenol (acetaminophen).     Call your doctor for any of the followin.  Signs of infection (fever, growing tenderness at the surgery site, severe pain, a large amount of drainage or bleeding, foul-smelling drainage, redness, swelling).    2.  It has been over 8 to 10 hours since surgery and you are still not able to urinate (pee).    3.   Headache for over 24 hours.    4.  Numbness, tingling or weakness the day after surgery (if you had spinal anesthesia).  To contact a doctor, call _____________________________________ or:      [phone] and ask for the Resident On Call for:          __________________________________________ (answered 24 hours a day)      Emergency Department:  [facility]: [phone]  [facility]: [phone]               Rev. 10/2014     After Hernia Surgery    You can usually go home the same day as surgery. If you had surgery to repair ventral or incisional hernia, you may need to stay in the hospital overnight. To speed healing, take an active role in your recovery. The tips below can help.  Reducing swelling  Early on, it s common for the area around your incision to be swollen, bruised, and sore. To reduce swelling, put an ice pack or bag of frozen peas in a thin towel. Place the towel on the swollen area 3 to 5 times a day for 15 to 20 minutes at a time.  Managing pain  Take any prescribed pain medicines as directed. Be aware that some pain medicines can cause constipation. So your healthcare provider may also suggest a laxative or stool softener.  Returning to normal  You can return to your normal routine as soon as you feel able. Just take it easy and follow these guidelines:    Take short walks to improve circulation.    Avoid heavy lifting for at least a week.    Ask your healthcare provider when you can drive and return to work.    You can have sex again when you feel ready.  Follow-up care  Be sure to keep all follow-up appointments with your healthcare provider. These ensure you re healing well. During visits, your stitches, staples, or bandage may be removed.  When to call your healthcare provider   Call your healthcare provider if you have any of the following:    A large amount of swelling or bruising (some testicular swelling and bruising is normal)    Fever of  100.4 F (38 C) or higher, or as directed by your healthcare provider    Pain, redness, bleeding, or fluid from the incision that gets worse    Trouble urinating    Constipation    Vomiting   Date Last Reviewed: 10/1/2016    3306-4776 [employer]. [address] [place] [address]. All rights reserved. This information is not intended as a substitute for professional medical care. Always follow your healthcare professional's instructions.

## 2018-01-18 NOTE — ANESTHESIA CARE TRANSFER NOTE
Patient: Dell Matos    Procedure(s):  Open Umbilical Hernia Repair With Mesh  - Wound Class: I-Clean    Diagnosis: Umbilical Hernia  Diagnosis Additional Information: No value filed.    Anesthesia Type:   MAC     Note:  Airway :Room Air  Patient transferred to:Phase II  Handoff Report: Identifed the Patient, Identified the Reponsible Provider, Reviewed the pertinent medical history, Discussed the surgical course, Reviewed Intra-OP anesthesia mangement and issues during anesthesia, Set expectations for post-procedure period and Allowed opportunity for questions and acknowledgement of understanding      Vitals: (Last set prior to Anesthesia Care Transfer)    CRNA VITALS  1/18/2018 1410 - 1/18/2018 1445      1/18/2018             Pulse: 100    SpO2: 99 %                Electronically Signed By: ANNA Mccormack CRNA  January 18, 2018  2:45 PM

## 2018-01-18 NOTE — ANESTHESIA POSTPROCEDURE EVALUATION
Patient: Dell Matos    Procedure(s):  Open Umbilical Hernia Repair With Mesh  - Wound Class: I-Clean    Diagnosis:Umbilical Hernia  Diagnosis Additional Information: No value filed.    Anesthesia Type:  MAC    Note:  Anesthesia Post Evaluation    Patient location during evaluation: PACU  Patient participation: Able to fully participate in evaluation  Level of consciousness: awake and alert  Pain management: adequate  Airway patency: patent  Cardiovascular status: acceptable  Respiratory status: acceptable  Hydration status: acceptable  PONV: none     Anesthetic complications: None          Last vitals:  Vitals:    01/18/18 1445 01/18/18 1500 01/18/18 1515   BP: 100/69 107/75 110/70   Resp: 16 16 16   Temp:      SpO2: 98% 98% 98%         Electronically Signed By: Marilin Upton MD  January 18, 2018  3:30 PM

## 2018-01-18 NOTE — IP AVS SNAPSHOT
MRN:[id number]                      After Visit Summary   1/18/2018    Dell Matos    MRN: [id number]           Thank you!     Thank you for choosing [facility] for your care. Our goal is always to provide you with excellent care. Hearing back from our patients is one way we can continue to improve our services. Please take a few minutes to complete the written survey that you may receive in the mail after you visit with us. Thank you!        Patient Information     Date Of Birth          1955        About your hospital stay     You were admitted on:  January 18, 2018 You last received care in the:  UR PREOP/PHASE II    You were discharged on:  January 18, 2018       Who to Call     For medical emergencies, please call 911.  For non-urgent questions about your medical care, please call your primary care provider or clinic, [phone]  For questions related to your surgery, please call your surgery clinic        Attending Provider     Provider Specialty    Yoan Olmstead MD Surgery       Primary Care Provider Office Phone # Fax #    Tae Knott [phone]      After Care Instructions     Diet Instructions       Resume pre-procedure diet            Discharge Instructions       Follow up appointment will be arranged by post op phone call from RN            No lifting       No lifting over 15 lbs and no strenuous physical activity for 3 weeks            Shower       No shower for 24 hours post procedure. May shower Postoperative Day (POD)  1                  Further instructions from your care team       Same-Day Surgery   Adult Discharge Orders & Instructions     For 24 hours after surgery:  1. Get plenty of rest.  A responsible adult must stay with you for at least 24 hours after you leave the hospital.   2. Pain medication can slow your reflexes. Do not drive or use heavy equipment.  If you have weakness or tingling, don't drive or use heavy equipment until this  feeling goes away.  3. Mixing alcohol and pain medication can cause dizziness and slow your breathing. It can even be fatal. Do not drink alcohol while taking pain medication.  4. Avoid strenuous or risky activities.  Ask for help when climbing stairs.   5. You may feel lightheaded.  If so, sit for a few minutes before standing.  Have someone help you get up.   6. If you have nausea (feel sick to your stomach), drink only clear liquids such as apple juice, ginger ale, broth or 7-Up.  Rest may also help.  Be sure to drink enough fluids.  Move to a regular diet as you feel able. Take pain medications with a small amount of solid food, such as toast or crackers, to avoid nausea.   7. A slight fever is normal. Call the doctor if your fever is over 100 F (37.7 C) (taken under the tongue) or lasts longer than 24 hours.  8. You may have a dry mouth, muscle aches, trouble sleeping or a sore throat.  These symptoms should go away after 24 hours.  9. Do not make important or legal decisions.   Pain Management:      1. Take pain medication (if prescribed) for pain as directed by your physician.        2. WARNING: If the pain medication you have been prescribed contains Tylenol  (acetaminophen), DO NOT take additional doses of Tylenol (acetaminophen).     Call your doctor for any of the followin.  Signs of infection (fever, growing tenderness at the surgery site, severe pain, a large amount of drainage or bleeding, foul-smelling drainage, redness, swelling).    2.  It has been over 8 to 10 hours since surgery and you are still not able to urinate (pee).    3.  Headache for over 24 hours.    4.  Numbness, tingling or weakness the day after surgery (if you had spinal anesthesia).  To contact a doctor, call _____________________________________ or:      [phone] and ask for the Resident On Call for:          __________________________________________ (answered 24 hours a day)      Emergency Department:  [facility]  Emergency Department: [phone]  [facility] Department: [phone]               Rev. 10/2014     After Hernia Surgery    You can usually go home the same day as surgery. If you had surgery to repair ventral or incisional hernia, you may need to stay in the hospital overnight. To speed healing, take an active role in your recovery. The tips below can help.  Reducing swelling  Early on, it s common for the area around your incision to be swollen, bruised, and sore. To reduce swelling, put an ice pack or bag of frozen peas in a thin towel. Place the towel on the swollen area 3 to 5 times a day for 15 to 20 minutes at a time.  Managing pain  Take any prescribed pain medicines as directed. Be aware that some pain medicines can cause constipation. So your healthcare provider may also suggest a laxative or stool softener.  Returning to normal  You can return to your normal routine as soon as you feel able. Just take it easy and follow these guidelines:    Take short walks to improve circulation.    Avoid heavy lifting for at least a week.    Ask your healthcare provider when you can drive and return to work.    You can have sex again when you feel ready.  Follow-up care  Be sure to keep all follow-up appointments with your healthcare provider. These ensure you re healing well. During visits, your stitches, staples, or bandage may be removed.  When to call your healthcare provider   Call your healthcare provider if you have any of the following:    A large amount of swelling or bruising (some testicular swelling and bruising is normal)    Fever of 100.4 F (38 C) or higher, or as directed by your healthcare provider    Pain, redness, bleeding, or fluid from the incision that gets worse    Trouble urinating    Constipation    Vomiting   Date Last Reviewed: 10/1/2016    9742-2982 [employer]. [address] [place] [address], [place] [address]. All rights reserved. This information is not intended as a  "substitute for professional medical care. Always follow your healthcare professional's instructions.          Pending Results     No orders found from 2018 to 2018.            Admission Information     Date & Time Provider Department Dept. Phone    2018 Yoan Olmstead MD UR PREOP/PHASE [phone]      Your Vitals Were     Blood Pressure Temperature Respirations Height Weight Pulse Oximetry    100/69 97.7  F (36.5  C) (Oral) 16 1.753 m (5' 9\") 61.8 kg (136 lb 3.9 oz) 98%    BMI (Body Mass Index)                   20.12 kg/m2           MyChart Information     [facility] lets you send messages to your doctor, view your test results, renew your prescriptions, schedule appointments and more. To sign up, go to [email or web address] . Click on \"Log in\" on the left side of the screen, which will take you to the Welcome page. Then click on \"Sign up Now\" on the right side of the page.     You will be asked to enter the access code listed below, as well as some personal information. Please follow the directions to create your username and password.     Your access code is: [email or web address]  Expires: 3/5/2018  6:30 AM     Your access code will  in 90 days. If you need help or a new code, please call your [facility] clinic or [phone].        Care EveryWhere ID     This is your Care EveryWhere ID. This could be used by other organizations to access your [facility] medical records  [id number]        Equal Access to Services     [facility]: Hadii marya guerra Soroxy, waaxda luqadaha, qaybta kaalmabarry barrett, preethi idiin hayaan adeeg kharash la'aan . So [facility] [phone].    ATENCIÓN: Si habla español, tiene a doss disposición servicios gratuitos de asistencia lingüística. Llame al [phone].    We comply with applicable federal civil rights laws and [place] laws. We do not discriminate on the basis of race, color, national origin, age, disability, sex, sexual orientation, or gender " identity.               Review of your medicines      START taking        Dose / Directions    oxyCODONE IR 5 MG tablet   Commonly known as:  ROXICODONE   Used for:  Umbilical hernia without obstruction and without gangrene        Dose:  5-10 mg   Take 1-2 tablets (5-10 mg) by mouth every 3 hours as needed for pain or other (Moderate to Severe)   Quantity:  20 tablet   Refills:  0       senna-docusate 8.6-50 MG per tablet   Commonly known as:  SENOKOT-S;PERICOLACE   Used for:  Umbilical hernia without obstruction and without gangrene        Dose:  1-2 tablet   Take 1-2 tablets by mouth 2 times daily Take while on oral narcotics to prevent or treat constipation.   Quantity:  30 tablet   Refills:  0         CONTINUE these medicines which have NOT CHANGED        Dose / Directions    acetaminophen 500 MG tablet   Commonly known as:  TYLENOL        Dose:  1000 mg   Take 1,000 mg by mouth nightly as needed   Refills:  0       D 5000 5000 UNITS Caps   Generic drug:  cholecalciferol        Dose:  5000 Units   Take 5,000 Units by mouth every morning   Refills:  0       diphenhydrAMINE 25 MG capsule   Commonly known as:  BENADRYL        Dose:  50 mg   Take 50 mg by mouth   Refills:  0       folic acid 1 MG tablet   Commonly known as:  FOLVITE        Dose:  1 mg   Take 1 mg by mouth every morning   Refills:  0       furosemide 40 MG tablet   Commonly known as:  LASIX        Dose:  60 mg   Take 60 mg by mouth every morning   Refills:  0       lactulose 10 GM/15ML solution   Commonly known as:  CHRONULAC        Dose:  22.5 mL   Take 22.5 mLs by mouth every morning   Refills:  0       melatonin 3 MG tablet        Dose:  3 mg   Take 3 mg by mouth At Bedtime   Refills:  0       omeprazole 40 MG capsule   Commonly known as:  priLOSEC        TAKE 1 CAPSULE BY MOUTH TWI CE A DAY   Refills:  0       order for DME   Used for:  Umbilical hernia without obstruction and without gangrene        Abdominal Binder - Medium   Quantity:  1 each    Refills:  0       potassium chloride SA 20 MEQ CR tablet   Commonly known as:  K-DUR/KLOR-CON M        Dose:  20 mEq   Take 20 mEq by mouth every morning   Refills:  0       rifaximin 550 MG Tabs tablet   Commonly known as:  XIFAXAN        Dose:  550 mg   Take 550 mg by mouth   Refills:  0       spironolactone 25 MG tablet   Commonly known as:  ALDACTONE        Dose:  50 mg   Take 50 mg by mouth 2 times daily   Refills:  0       thiamine 100 MG tablet        Dose:  100 mg   Take 100 mg by mouth every morning   Refills:  0            Where to get your medicines      These medications were sent to [facility] [place], [place] - [address]  [address] [place] [address], [facility] [address]     Phone:  [phone]     senna-docusate 8.6-50 MG per tablet         Some of these will need a paper prescription and others can be bought over the counter. Ask your nurse if you have questions.     Bring a paper prescription for each of these medications     oxyCODONE IR 5 MG tablet                Protect others around you: Learn how to safely use, store and throw away your medicines at www.disposemymeds.org.             Medication List: This is a list of all your medications and when to take them. Check marks below indicate your daily home schedule. Keep this list as a reference.      Medications           Morning Afternoon Evening Bedtime As Needed    acetaminophen 500 MG tablet   Commonly known as:  TYLENOL   Take 1,000 mg by mouth nightly as needed                                D 5000 5000 UNITS Caps   Take 5,000 Units by mouth every morning   Generic drug:  cholecalciferol                                diphenhydrAMINE 25 MG capsule   Commonly known as:  BENADRYL   Take 50 mg by mouth                                folic acid 1 MG tablet   Commonly known as:  FOLVITE   Take 1 mg by mouth every morning                                furosemide 40 MG tablet   Commonly known as:  LASIX   Take 60 mg by mouth  every morning                                lactulose 10 GM/15ML solution   Commonly known as:  CHRONULAC   Take 22.5 mLs by mouth every morning                                melatonin 3 MG tablet   Take 3 mg by mouth At Bedtime                                omeprazole 40 MG capsule   Commonly known as:  priLOSEC   TAKE 1 CAPSULE BY MOUTH TWI CE A DAY                                order for DME   Abdominal Binder - Medium                                oxyCODONE IR 5 MG tablet   Commonly known as:  ROXICODONE   Take 1-2 tablets (5-10 mg) by mouth every 3 hours as needed for pain or other (Moderate to Severe)                                potassium chloride SA 20 MEQ CR tablet   Commonly known as:  K-DUR/KLOR-CON M   Take 20 mEq by mouth every morning                                rifaximin 550 MG Tabs tablet   Commonly known as:  XIFAXAN   Take 550 mg by mouth                                senna-docusate 8.6-50 MG per tablet   Commonly known as:  SENOKOT-S;PERICOLACE   Take 1-2 tablets by mouth 2 times daily Take while on oral narcotics to prevent or treat constipation.                                spironolactone 25 MG tablet   Commonly known as:  ALDACTONE   Take 50 mg by mouth 2 times daily                                thiamine 100 MG tablet   Take 100 mg by mouth every morning

## 2018-01-18 NOTE — IP AVS SNAPSHOT
UR PREOP/PHASE II    [address] [place] [address]    Phone:  [phone]                                       After Visit Summary   1/18/2018    Dell Matos    MRN: [id number]           After Visit Summary Signature Page     I have received my discharge instructions, and my questions have been answered. I have discussed any challenges I see with this plan with the nurse or doctor.    ..........................................................................................................................................  Patient/Patient Representative Signature      ..........................................................................................................................................  Patient Representative Print Name and Relationship to Patient    ..................................................               ................................................  Date                                            Time    ..........................................................................................................................................  Reviewed by Signature/Title    ...................................................              ..............................................  Date                                                            Time

## 2018-01-18 NOTE — BRIEF OP NOTE
[facility] Brief Operative Note    Pre-operative diagnosis: Umbilical Hernia   Post-operative diagnosis Same as Pre-op Dx   Procedure: Procedure(s):  Open Umbilical Hernia Repair With Mesh  - Wound Class: I-Clean   Surgeon: Yoan Omlstead MD   Assistants(s):    Estimated blood loss: Minimal    Specimens: None   Findings: Large hernia sac see dictation

## 2018-01-18 NOTE — OP NOTE
DATE OF PROCEDURE:  01/18/2018      PREOPERATIVE DIAGNOSIS:  Umbilical hernia.      POSTOPERATIVE DIAGNOSIS:  Umbilical hernia.      PROCEDURE:  Open mesh repair umbilical hernia, increased difficulty modifier.      SURGEON:  Yoan Olmstead MD      ANESTHESIA:  IV sedation plus local infiltration of 0.5% Marcaine without epinephrine.      INDICATIONS FOR PROCEDURE:  Dell Matos with end-stage liver disease and significant ascites, presents with a symptomatic umbilical hernia and a request made for operative repair. The patient understood the increased risk for perioperative complications given his end-stage liver disease; however, he pushed for evaluation and agreed to proceed, understanding the increased risks.  Informed consent was obtained.      OPERATIVE FINDINGS:  Significant umbilical hernia, large hernia sac with involved overlying skin, some marginally viable skin over that hernia sac, increased difficulty secondary to difficult anatomy with increased operative time greater than 1 hour.      DESCRIPTION OF PROCEDURE:  The patient was brought to the operating room and put under IV sedation.  His periumbilical region was widely prepped and draped in the usual sterile fashion and infiltrated with 0.5% Marcaine throughout for adequate anesthetic.        Elliptical skin incision was made around the umbilicus and the umbilical skin and for a wide excision of the umbilicus and the umbilical skin.  Dissection was carried down with the hernia sac down to the fascial defect, which was about 4-5 cm in greatest dimension.  The distal end of the hernia sac with its overlying skin was thus resected, passed off the field and the hernia sac defect was closed with 2-0 PDS pursestrings that gained good sealing of that hernia sac.  The preperitoneal space was then developed to allow for placement of a large patch of Parietex ProGrip mesh placed as an underlay.  The mesh was approximately 10 x 8 cm.  It was secured in 4  quadrants using 2-0 PDS in the corners and the fascia was then closed in 2 layers with a vest-over-pants technique using 0 PDS per my routine technique.  I then closed in multiple layers using PDS and 4-0 Monocryl to skin, and given this patient's ascites, these layers were multiple and a locking stitch was also placed at the skin for a good seal.  Steri-Strips were also applied.  Estimated blood loss was minimal.  The patient tolerated the procedure well and was taken to the recovery room where he was without difficult or apparent complication.         CAN APARICIO MD             D: 2018 14:36   T: 2018 15:54   MT: LOGAN      Name:     FELY ELLER   MRN:      [id number]        Account:        [id number]   :      1955           Procedure Date: 2018      Document: [id number]       cc: [facility] Surgery Billing

## 2018-01-22 ENCOUNTER — CARE COORDINATION (OUTPATIENT)
Dept: SURGERY | Facility: CLINIC | Age: 63
End: 2018-01-22

## 2018-01-22 NOTE — PROGRESS NOTES
Dell Matos is a patient of Dr. Yoan Olmstead that underwent a Open umbilical hernia repair approximately 4 days ago.  Attempted to contact patient via telephone for a status update and review post op teaching.  LM on VM to call office.  Await return call.      Of note:  Pathology:  na  Wound:  Steri-strips  Follow-up:  Routine  Restrictions:  - No lifting, pushing, pulling more than 15-20 pounds for 3-4 weeks  New medications:  Senna, Oxycodone

## 2018-01-24 NOTE — PROGRESS NOTES
RN Post-Op/Post-Discharge Care Coordination Note    Mr. Dell Matos is a 62 year old male who underwent open VHR on 1/18 with  Dr. Yoan Olmstead.  Spoke with daughter.    Support  Patient able to care for self independently     Health Status  Fevers/chills: Patient denies any fever or chills.  Nausea/Vomiting: Patient denies nausea/vomiting.  Eating/drinking: Patient is able to eat and drink without any complaints.  Bowel habits: Patient reports having a normal bowel movement.  Drains (TRAVON): N/A  Incisions: Patient denies any signs and symptoms of infection..  Wound closure:  Steri-strips .  No drainage area C/D/I  Pain: Minimal to none.  No longer taking narcotics, using Ibuprofen per package instructions PRN  New Medications:  Norco, Senna    Activity/Restrictions  No lifting in excess of 15-20 pounds for 3-4 weeks    Equipment  None    Pathology reviewed with patient:  N/A    All of his questions were answered including reviewing restrictions and wound care.  He will call this office if he has any further questions and/or concerns.      In lieu of a post-op clinic patient that patient would like to be contacted in approximately 10 days via telephone.    Whom and When to Call  Patient acknowledges understanding of how to manage any medication changes and   when to seek medical care.     Patient advised that if after hour medical concerns arise to please call [phone] and choose option 4 to speak to the physician on call.     A copy of this note was routed to the primary surgeon.

## 2018-02-02 ENCOUNTER — CARE COORDINATION (OUTPATIENT)
Dept: SURGERY | Facility: CLINIC | Age: 63
End: 2018-02-02

## 2018-11-18 NOTE — PROGRESS NOTES
Dell Matos was contacted several days post procedure via telephone for a status update and elected to have a telephone follow -up call approximately 10 days after original contact in lieu of a clinic visit with Dr. Yoan Olmstead.  He continues to do well and the steri-strips have fallen.  The patients wounds are healed and the area is C/D/I.  He is afebrile, pain free, and kelle po; the patient is slowly resuming his normal activity.       Pathology was reviewed with the patient: N/A    All of Dell Matos question's were answered and  he would like to return to the clinic on a PRN basis.      A copy of this note was routed to his surgeon.             none

## 2024-03-27 NOTE — TELEPHONE ENCOUNTER
Per task, I called the patient to discuss the location change of his surgery for 01/18/2018. The phone rang multiple times without anyone answering it. I was unable to speak to patient or leave a message.   Seda

## (undated) DEVICE — LINEN ORTHO PACK 5446

## (undated) DEVICE — LINEN GOWN X4 5410

## (undated) DEVICE — SOL NACL 0.9% IRRIG 1000ML BOTTLE 2F7124

## (undated) DEVICE — ESU GROUND PAD UNIVERSAL W/O CORD

## (undated) DEVICE — Device

## (undated) DEVICE — GLOVE PROTEXIS W/NEU-THERA 7.0  2D73TE70

## (undated) DEVICE — BLADE KNIFE SURG 10 371110

## (undated) DEVICE — SU VICRYL 3-0 SH 27" UND J416H

## (undated) DEVICE — GLOVE PROTEXIS BLUE W/NEU-THERA 7.5  2D73EB75

## (undated) DEVICE — SU PDS II 2-0 CT-2 27"  Z333H

## (undated) DEVICE — PREP CHLORAPREP 26ML TINTED ORANGE  260815

## (undated) DEVICE — DRAPE LAP W/ARMBOARD 29410

## (undated) DEVICE — SUCTION MANIFOLD DORNOCH ULTRA CART UL-CL500

## (undated) DEVICE — BNDG ABDOMINAL BINDER 9X30-45" 79-89070

## (undated) DEVICE — DRSG STERI STRIP 1/2X4" R1547

## (undated) DEVICE — SU MONOCRYL 4-0 PS-2 18" UND Y496G

## (undated) DEVICE — SU PDS II 0 CT-2 27" Z334H

## (undated) DEVICE — LINEN TOWEL PACK X5 5464

## (undated) DEVICE — GLOVE PROTEXIS W/NEU-THERA 7.5  2D73TE75

## (undated) DEVICE — STRAP KNEE/BODY 31143004

## (undated) RX ORDER — FENTANYL CITRATE 50 UG/ML
INJECTION, SOLUTION INTRAMUSCULAR; INTRAVENOUS
Status: DISPENSED
Start: 2018-01-18

## (undated) RX ORDER — CEFAZOLIN SODIUM 2 G/100ML
INJECTION, SOLUTION INTRAVENOUS
Status: DISPENSED
Start: 2018-01-18

## (undated) RX ORDER — PROPOFOL 10 MG/ML
INJECTION, EMULSION INTRAVENOUS
Status: DISPENSED
Start: 2018-01-18

## (undated) RX ORDER — LIDOCAINE HYDROCHLORIDE 20 MG/ML
INJECTION, SOLUTION EPIDURAL; INFILTRATION; INTRACAUDAL; PERINEURAL
Status: DISPENSED
Start: 2018-01-18

## (undated) RX ORDER — OXYCODONE HYDROCHLORIDE 5 MG/1
TABLET ORAL
Status: DISPENSED
Start: 2018-01-18

## (undated) RX ORDER — CITRIC ACID/SODIUM CITRATE 334-500MG
SOLUTION, ORAL ORAL
Status: DISPENSED
Start: 2018-01-18

## (undated) RX ORDER — ONDANSETRON 2 MG/ML
INJECTION INTRAMUSCULAR; INTRAVENOUS
Status: DISPENSED
Start: 2018-01-18